# Patient Record
Sex: FEMALE | Race: WHITE | NOT HISPANIC OR LATINO | Employment: FULL TIME | URBAN - METROPOLITAN AREA
[De-identification: names, ages, dates, MRNs, and addresses within clinical notes are randomized per-mention and may not be internally consistent; named-entity substitution may affect disease eponyms.]

---

## 2018-11-28 ENCOUNTER — TELEPHONE (OUTPATIENT)
Dept: GASTROENTEROLOGY | Facility: CLINIC | Age: 53
End: 2018-11-28

## 2018-11-28 ENCOUNTER — OFFICE VISIT (OUTPATIENT)
Dept: GASTROENTEROLOGY | Facility: CLINIC | Age: 53
End: 2018-11-28

## 2018-11-28 VITALS
BODY MASS INDEX: 34.09 KG/M2 | WEIGHT: 192.4 LBS | SYSTOLIC BLOOD PRESSURE: 126 MMHG | DIASTOLIC BLOOD PRESSURE: 84 MMHG | HEIGHT: 63 IN | TEMPERATURE: 98.2 F

## 2018-11-28 DIAGNOSIS — K62.5 RECTAL BLEEDING: ICD-10-CM

## 2018-11-28 DIAGNOSIS — K57.30 DIVERTICULOSIS OF LARGE INTESTINE WITHOUT HEMORRHAGE: ICD-10-CM

## 2018-11-28 DIAGNOSIS — K21.9 GASTROESOPHAGEAL REFLUX DISEASE, ESOPHAGITIS PRESENCE NOT SPECIFIED: ICD-10-CM

## 2018-11-28 DIAGNOSIS — R10.84 GENERALIZED ABDOMINAL PAIN: Primary | ICD-10-CM

## 2018-11-28 PROCEDURE — 99204 OFFICE O/P NEW MOD 45 MIN: CPT | Performed by: INTERNAL MEDICINE

## 2018-11-28 RX ORDER — METOCLOPRAMIDE 10 MG/1
TABLET ORAL
Refills: 0 | COMMUNITY
Start: 2018-11-08 | End: 2018-11-28 | Stop reason: SDUPTHER

## 2018-11-28 RX ORDER — METOCLOPRAMIDE 10 MG/1
10 TABLET ORAL DAILY
Qty: 30 TABLET | Refills: 1 | Status: SHIPPED | OUTPATIENT
Start: 2018-11-28 | End: 2018-12-21

## 2018-11-28 RX ORDER — PANTOPRAZOLE SODIUM 40 MG/1
40 TABLET, DELAYED RELEASE ORAL DAILY
Qty: 30 TABLET | Refills: 5 | Status: SHIPPED | OUTPATIENT
Start: 2018-11-28 | End: 2018-12-21 | Stop reason: SDUPTHER

## 2018-11-28 RX ORDER — COVID-19 ANTIGEN TEST
KIT MISCELLANEOUS DAILY
Refills: 11 | COMMUNITY
Start: 2018-09-11

## 2018-11-28 RX ORDER — LEVOTHYROXINE SODIUM 0.05 MG/1
50 TABLET ORAL DAILY
COMMUNITY
Start: 2018-04-06 | End: 2019-04-06

## 2018-11-28 NOTE — PROGRESS NOTES
Chief Complaint   Patient presents with   • Abdominal Pain     Teena Romero is a 53 y.o. female who presents with abdominal pain.  She started having issues at the beginning of Nov.  She was taking a diet supplement.  BMs were runny.  Her PCP gave her reglan and this has helped tremendously.  She has a history of gerd and this is chronic.  No n/v.  She called an ambulance and went to the ER at [facility].   She was diagnosed w/UTI - no urinary symptoms or fever.      CT 11/3/18 at [facility]-  IMPRESSION:   1. No acute abdominal or pelvic abnormality.  2. Mild diverticulosis.  3. Diffuse hepatic steatosis.  4. Trace edema within the mesentery with mildly prominent mesenteric lymph nodes, unchanged and suggestive of mild mesenteric panniculitis.   5. 2.3 cm left ovarian cyst.    The pain was originally upper abdomen but now more at the bottom part of the abdomen.  Stools are more solid and less frequent since starting reglan.  She has seen blood when she wipes.     Last c/s 3 years ago was normal - Dr Borjas.    HPI  Past Medical History:   Diagnosis Date   • Thyroid disorder      Past Surgical History:   Procedure Laterality Date   • COLONOSCOPY  12/05/2003   • ENDOSCOPY  09/25/2012    Z-line irreg, HH, gastritis, clear gastric fluid, gastric stenosis at pylorus, gastric polyps (HP), duodenitis,    • HYSTERECTOMY         Current Outpatient Medications:   •  levothyroxine (SYNTHROID, LEVOTHROID) 50 MCG tablet, Take 50 mcg by mouth Daily., Disp: , Rfl:   •  metoclopramide (REGLAN) 10 MG tablet, Take 1 tablet by mouth Daily., Disp: 30 tablet, Rfl: 1  •  sertraline (ZOLOFT) 50 MG tablet, Take 50 mg by mouth Daily., Disp: , Rfl:   •  WAL-FEX ALLERGY 180 MG tablet, Take  by mouth Daily., Disp: , Rfl: 11  •  pantoprazole (PROTONIX) 40 MG EC tablet, Take 1 tablet by mouth Daily., Disp: 30 tablet, Rfl: 5  Allergies   Allergen Reactions   • Penicillins Rash     Social History     Socioeconomic History   • Marital status:       Spouse name: Not on file   • Number of children: Not on file   • Years of education: Not on file   • Highest education level: Not on file   Social Needs   • Financial resource strain: Not on file   • Food insecurity - worry: Not on file   • Food insecurity - inability: Not on file   • Transportation needs - medical: Not on file   • Transportation needs - non-medical: Not on file   Occupational History   • Not on file   Tobacco Use   • Smoking status: Never Smoker   Substance and Sexual Activity   • Alcohol use: Yes     Comment: occ   • Drug use: Not on file   • Sexual activity: Not on file   Other Topics Concern   • Not on file   Social History Narrative   • Not on file     Family History   Problem Relation Age of Onset   • LORA disease Mother      Review of Systems   Constitutional: Negative for appetite change and unexpected weight change.   Gastrointestinal: Positive for abdominal pain. Negative for blood in stool, nausea and vomiting.   All other systems reviewed and are negative.    Vitals:    11/28/18 1424   BP: 126/84   Temp: 98.2 °F (36.8 °C)         11/28/18  1424   Weight: 87.3 kg (192 lb 6.4 oz)     Physical Exam   Constitutional: She appears well-developed and well-nourished.   HENT:   Head: Normocephalic and atraumatic.   Eyes: No scleral icterus.   Pulmonary/Chest: Effort normal.   Abdominal: Soft. She exhibits no distension and no mass. There is tenderness.   Upper abdominal tenderness and llq    Neurological: She is alert.   Skin: Skin is warm and dry.   Psychiatric: She has a normal mood and affect.     No images are attached to the encounter.  No notes on file  Teena was seen today for abdominal pain.    Diagnoses and all orders for this visit:    Generalized abdominal pain  -     Case Request; Standing  -     Case Request    Diverticulosis of large intestine without hemorrhage    Rectal bleeding    Gastroesophageal reflux disease, esophagitis presence not specified    Other orders  -     Follow  Anesthesia Guidelines / Standing Orders; Future  -     Implement Anesthesia orders day of procedure.; Standing  -     Obtain informed consent; Standing  -     Verify bowel prep was successful; Standing  -     Give tap water enema if bowel prep was insufficient; Standing  -     pantoprazole (PROTONIX) 40 MG EC tablet; Take 1 tablet by mouth Daily.  -     metoclopramide (REGLAN) 10 MG tablet; Take 1 tablet by mouth Daily.    Plan:  - start fiber supplement daily  - increase acid suppression - pantoprazole sent to pharmacy  - cont reglan qhs for now until scopes  - egd/c/s for further eval

## 2018-12-21 ENCOUNTER — OUTSIDE FACILITY SERVICE (OUTPATIENT)
Dept: GASTROENTEROLOGY | Facility: CLINIC | Age: 53
End: 2018-12-21

## 2018-12-21 PROCEDURE — 43239 EGD BIOPSY SINGLE/MULTIPLE: CPT | Performed by: INTERNAL MEDICINE

## 2018-12-21 PROCEDURE — 45385 COLONOSCOPY W/LESION REMOVAL: CPT | Performed by: INTERNAL MEDICINE

## 2018-12-21 RX ORDER — SUCRALFATE 1 G/1
TABLET ORAL
Qty: 360 TABLET | Refills: 1 | OUTPATIENT
Start: 2018-12-21

## 2018-12-21 RX ORDER — SUCRALFATE 1 G/1
1 TABLET ORAL 4 TIMES DAILY
Qty: 120 TABLET | Refills: 1 | Status: SHIPPED | OUTPATIENT
Start: 2018-12-21 | End: 2020-03-05 | Stop reason: SDUPTHER

## 2018-12-21 RX ORDER — PANTOPRAZOLE SODIUM 40 MG/1
40 TABLET, DELAYED RELEASE ORAL
Qty: 60 TABLET | Refills: 5 | Status: SHIPPED | OUTPATIENT
Start: 2018-12-21 | End: 2020-03-05 | Stop reason: SDUPTHER

## 2019-01-03 ENCOUNTER — TELEPHONE (OUTPATIENT)
Dept: GASTROENTEROLOGY | Facility: CLINIC | Age: 54
End: 2019-01-03

## 2019-01-03 NOTE — TELEPHONE ENCOUNTER
Please her know that her stomach biopsy showed mild inflammation, small bowel biopsies were normal.    The polyp(s) biopsies showed adenomatous change. This is not cancerous but is considered potentially precancerous. Follow-up colonoscopy in one year years is advised due to the presence of adenomatous polyps and suboptimal prep.    Please schedule office follow-up to see me - I can see her February 14 at 1 PM please have Yinka carranza if she is agreeable

## 2019-01-03 NOTE — TELEPHONE ENCOUNTER
Call to pt.  Advise per Dr Barbosa that stomach bx showed mild inflammation, small bowel bx were normal.    Polyp(s) bx showed adenomatous change.  This is not cancerous, but is considered potentially precancerous.  F/u c/s in  Yr is advised due to presence of adenomatous polyp and suboptimal prep.    F/u appt with DR Barbosa 2/14 @ 1pm.  Pt accepts.  Message to Manager.    C/s for 12/21/19 is in recall.

## 2019-02-28 ENCOUNTER — OFFICE VISIT (OUTPATIENT)
Dept: GASTROENTEROLOGY | Facility: CLINIC | Age: 54
End: 2019-02-28

## 2019-02-28 VITALS
BODY MASS INDEX: 34.23 KG/M2 | WEIGHT: 193.2 LBS | SYSTOLIC BLOOD PRESSURE: 124 MMHG | TEMPERATURE: 99 F | DIASTOLIC BLOOD PRESSURE: 90 MMHG | HEIGHT: 63 IN

## 2019-02-28 DIAGNOSIS — D12.6 ADENOMATOUS POLYP OF COLON, UNSPECIFIED PART OF COLON: ICD-10-CM

## 2019-02-28 DIAGNOSIS — R15.2 FECAL URGENCY: ICD-10-CM

## 2019-02-28 DIAGNOSIS — K25.3 ACUTE GASTRIC ULCER WITHOUT HEMORRHAGE OR PERFORATION: Primary | ICD-10-CM

## 2019-02-28 PROCEDURE — 99213 OFFICE O/P EST LOW 20 MIN: CPT | Performed by: INTERNAL MEDICINE

## 2019-02-28 NOTE — PROGRESS NOTES
Chief Complaint   Patient presents with   • Gastric ulcer       Teena Romero is a  53 y.o. female here for a follow up visit for abdominal pain.     She had an egd 12/21/18 that showed a pyloric channel ulcer.  Bx showed reactive gastropathy.    Colonoscopy was also performed - prep was poor - a tubular adenoma was removed.  She reports that she read the directions wrong and she ate at 1 PM the day prior to her procedure.1 year f/u recommended.    She reports her abdominal pain is much improved.  She feels like she is about 75% better at this point.  She is taking pantoprazole twice daily and Carafate 4 times a day although she finds it difficult to get for doses of this medication and.    She reports some fecal urgency.  She often times will have multiple bowel movements throughout the day.  Sometimes the bowel movements are small.  She does not see any blood in her stool.  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  It is worse after she eats.  HPI  Past Medical History:   Diagnosis Date   • Gastric ulcer    • GERD (gastroesophageal reflux disease)    • Thyroid disorder      Past Surgical History:   Procedure Laterality Date   • COLONOSCOPY  12/05/2003   • COLONOSCOPY  12/21/2018    Diverticulosis, IH, one TA   • ENDOSCOPY  09/25/2012    Z-line irreg, HH, gastritis, clear gastric fluid, gastric stenosis at pylorus, gastric polyps (HP), duodenitis,    • ENDOSCOPY  12/21/2018    gastritis, gastric ulcer   • HYSTERECTOMY         Current Outpatient Medications:   •  levothyroxine (SYNTHROID, LEVOTHROID) 50 MCG tablet, Take 50 mcg by mouth Daily., Disp: , Rfl:   •  pantoprazole (PROTONIX) 40 MG EC tablet, Take 1 tablet by mouth 2 (Two) Times a Day Before Meals., Disp: 60 tablet, Rfl: 5  •  sertraline (ZOLOFT) 50 MG tablet, Take 50 mg by mouth Daily., Disp: , Rfl:   •  sucralfate (CARAFATE) 1 g tablet, Take 1 tablet by mouth 4 (Four) Times a Day., Disp: 120  tablet, Rfl: 1  •  WAL-FEX ALLERGY 180 MG tablet, Take  by mouth Daily., Disp: , Rfl: 11  PRN Meds:.  Allergies   Allergen Reactions   • Penicillins Rash     Social History     Socioeconomic History   • Marital status:      Spouse name: Not on file   • Number of children: Not on file   • Years of education: Not on file   • Highest education level: Not on file   Social Needs   • Financial resource strain: Not on file   • Food insecurity - worry: Not on file   • Food insecurity - inability: Not on file   • Transportation needs - medical: Not on file   • Transportation needs - non-medical: Not on file   Occupational History   • Not on file   Tobacco Use   • Smoking status: Never Smoker   Substance and Sexual Activity   • Alcohol use: Yes     Comment: occ   • Drug use: Not on file   • Sexual activity: Not on file   Other Topics Concern   • Not on file   Social History Narrative   • Not on file     Family History   Problem Relation Age of Onset   • LORA disease Mother      Review of Systems   Constitutional: Negative for appetite change and unexpected weight change.   Gastrointestinal: Positive for abdominal pain. Negative for blood in stool.   All other systems reviewed and are negative.    Vitals:    02/28/19 1206   BP: 124/90   Temp: 99 °F (37.2 °C)         02/28/19  1206   Weight: 87.6 kg (193 lb 3.2 oz)     Physical Exam   Constitutional: She appears well-developed and well-nourished.   HENT:   Head: Normocephalic and atraumatic.   Eyes: No scleral icterus.   Pulmonary/Chest: Effort normal.   Abdominal: Soft. She exhibits no distension.   Neurological: She is alert.   Skin: Skin is warm and dry.   Psychiatric: She has a normal mood and affect.     No images are attached to the encounter.  Diagnoses and all orders for this visit:    Acute gastric ulcer without hemorrhage or perforation    Adenomatous polyp of colon, unspecified part of colon    Fecal urgency    Plan:  - start a fiber supplement daily  - cont  bid ppi for now - she has improved - she can use carafate prn  - repeat c/s later this year or early 2020 due to inadequate prep and hx adenomatous polyps - rec using miralax daily starting one week prior to prep

## 2020-02-05 RX ORDER — SUCRALFATE 1 G/1
TABLET ORAL
Qty: 120 TABLET | Refills: 1 | OUTPATIENT
Start: 2020-02-05

## 2020-02-19 RX ORDER — SUCRALFATE 1 G/1
TABLET ORAL
Qty: 120 TABLET | Refills: 1 | OUTPATIENT
Start: 2020-02-19

## 2020-02-19 RX ORDER — PANTOPRAZOLE SODIUM 40 MG/1
TABLET, DELAYED RELEASE ORAL
Qty: 60 TABLET | Refills: 5 | OUTPATIENT
Start: 2020-02-19

## 2020-03-05 ENCOUNTER — TELEPHONE (OUTPATIENT)
Dept: GASTROENTEROLOGY | Facility: CLINIC | Age: 55
End: 2020-03-05

## 2020-03-05 RX ORDER — PANTOPRAZOLE SODIUM 40 MG/1
40 TABLET, DELAYED RELEASE ORAL
Qty: 60 TABLET | Refills: 0 | Status: SHIPPED | OUTPATIENT
Start: 2020-03-05

## 2020-03-05 RX ORDER — SUCRALFATE 1 G/1
1 TABLET ORAL 4 TIMES DAILY
Qty: 120 TABLET | Refills: 0 | Status: SHIPPED | OUTPATIENT
Start: 2020-03-05

## 2020-03-05 NOTE — TELEPHONE ENCOUNTER
Called pt and advised that we have refilled her pantoprazole and sucralfate to get her by till her appt. Pt verb understanding.

## 2020-03-05 NOTE — TELEPHONE ENCOUNTER
----- Message from Delmi Lee sent at 3/5/2020 10:21 AM EST -----  Regarding: medication  Contact: [phone]  Pt is asking if we can call in the prescriptions pantoprazole (PROTONIX) 40 MG EC tablet and sucralfate (CARAFATE) 1 g tablet   until she come on 03/24/2020 she will be seeing Margaret and she will make the appt    Pharmacy:  [id number] [place], [place] - [facility] [address] AT [facility] [address] - [phone]  - [phone] FX Phone:  [phone] Fax:  [phone]   Address:  [facility] [address] [facility] [id number]

## 2020-03-06 RX ORDER — SUCRALFATE 1 G/1
TABLET ORAL
Qty: 360 TABLET | OUTPATIENT
Start: 2020-03-06

## 2020-03-06 RX ORDER — PANTOPRAZOLE SODIUM 40 MG/1
TABLET, DELAYED RELEASE ORAL
Qty: 180 TABLET | OUTPATIENT
Start: 2020-03-06

## 2020-03-26 ENCOUNTER — TELEPHONE (OUTPATIENT)
Dept: GASTROENTEROLOGY | Facility: CLINIC | Age: 55
End: 2020-03-26

## 2020-03-26 NOTE — TELEPHONE ENCOUNTER
fu----3-24 per msg from jesus manuel called to henna pt if she is having issue we are to let jesus manuel know and she will do a phone visit ad  Called x 2

## 2020-05-04 RX ORDER — PANTOPRAZOLE SODIUM 40 MG/1
TABLET, DELAYED RELEASE ORAL
Qty: 60 TABLET | Refills: 0 | OUTPATIENT
Start: 2020-05-04